# Patient Record
Sex: MALE | Race: WHITE
[De-identification: names, ages, dates, MRNs, and addresses within clinical notes are randomized per-mention and may not be internally consistent; named-entity substitution may affect disease eponyms.]

---

## 2021-05-13 ENCOUNTER — HOSPITAL ENCOUNTER (EMERGENCY)
Dept: HOSPITAL 94 - ER | Age: 51
Discharge: LEFT BEFORE DISCHARGE | End: 2021-05-13
Payer: COMMERCIAL

## 2021-05-13 VITALS — DIASTOLIC BLOOD PRESSURE: 82 MMHG | SYSTOLIC BLOOD PRESSURE: 133 MMHG

## 2021-05-13 VITALS — BODY MASS INDEX: 25.95 KG/M2 | WEIGHT: 185.39 LBS | HEIGHT: 71 IN

## 2021-05-13 DIAGNOSIS — H57.12: Primary | ICD-10-CM

## 2021-05-13 DIAGNOSIS — Z72.89: ICD-10-CM

## 2021-05-13 PROCEDURE — 99283 EMERGENCY DEPT VISIT LOW MDM: CPT

## 2021-05-13 NOTE — NUR
pt to room, assumed care.



pt working with glass today when it broke and states a small piece got into his 
left eye